# Patient Record
Sex: FEMALE | Race: OTHER | HISPANIC OR LATINO | URBAN - METROPOLITAN AREA
[De-identification: names, ages, dates, MRNs, and addresses within clinical notes are randomized per-mention and may not be internally consistent; named-entity substitution may affect disease eponyms.]

---
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
## 2022-12-01 ENCOUNTER — EMERGENCY (EMERGENCY)
Facility: HOSPITAL | Age: 22
LOS: 1 days | Discharge: HOME | End: 2022-12-01
Attending: STUDENT IN AN ORGANIZED HEALTH CARE EDUCATION/TRAINING PROGRAM
Payer: SELF-PAY

## 2022-12-01 VITALS
HEART RATE: 113 BPM | SYSTOLIC BLOOD PRESSURE: 119 MMHG | TEMPERATURE: 103 F | OXYGEN SATURATION: 98 % | DIASTOLIC BLOOD PRESSURE: 60 MMHG | RESPIRATION RATE: 20 BRPM | WEIGHT: 128.09 LBS

## 2022-12-01 VITALS
OXYGEN SATURATION: 98 % | DIASTOLIC BLOOD PRESSURE: 67 MMHG | RESPIRATION RATE: 17 BRPM | HEART RATE: 97 BPM | SYSTOLIC BLOOD PRESSURE: 111 MMHG

## 2022-12-01 LAB
FLUAV AG NPH QL: DETECTED
FLUBV AG NPH QL: SIGNIFICANT CHANGE UP
RSV RNA NPH QL NAA+NON-PROBE: SIGNIFICANT CHANGE UP
SARS-COV-2 RNA SPEC QL NAA+PROBE: SIGNIFICANT CHANGE UP

## 2022-12-01 PROCEDURE — 99283 EMERGENCY DEPT VISIT LOW MDM: CPT

## 2022-12-01 PROCEDURE — 87637 SARSCOV2&INF A&B&RSV AMP PRB: CPT

## 2022-12-01 PROCEDURE — 99284 EMERGENCY DEPT VISIT MOD MDM: CPT

## 2022-12-01 RX ORDER — ONDANSETRON 8 MG/1
4 TABLET, FILM COATED ORAL ONCE
Refills: 0 | Status: COMPLETED | OUTPATIENT
Start: 2022-12-01 | End: 2022-12-01

## 2022-12-01 RX ORDER — ACETAMINOPHEN 500 MG
650 TABLET ORAL ONCE
Refills: 0 | Status: COMPLETED | OUTPATIENT
Start: 2022-12-01 | End: 2022-12-01

## 2022-12-01 RX ORDER — ONDANSETRON 8 MG/1
1 TABLET, FILM COATED ORAL
Qty: 12 | Refills: 0
Start: 2022-12-01 | End: 2022-12-04

## 2022-12-01 RX ADMIN — Medication 650 MILLIGRAM(S): at 21:19

## 2022-12-01 RX ADMIN — ONDANSETRON 4 MILLIGRAM(S): 8 TABLET, FILM COATED ORAL at 21:19

## 2022-12-01 NOTE — ED ADULT TRIAGE NOTE - CHIEF COMPLAINT QUOTE
C/O fever, headache, body aches, nausea and vomiting  for one day. Last took Tylenol around 1pm. States she works with a lot of people with similar symptoms.

## 2022-12-01 NOTE — ED PROVIDER NOTE - PATIENT PORTAL LINK FT
You can access the FollowMyHealth Patient Portal offered by [facility] by registering at the following website: [email or web address]. By joining [employer]’s FollowMyHealth portal, you will also be able to view your health information using other applications (apps) compatible with our system.

## 2022-12-01 NOTE — ED PROVIDER NOTE - CLINICAL SUMMARY MEDICAL DECISION MAKING FREE TEXT BOX
22-year-old female with flulike symptoms.  Will control fever Zofran for antiemetic.  Flu swab.  We will send over Tamiflu to the pharmacy

## 2022-12-01 NOTE — ED ADULT NURSE NOTE - OBJECTIVE STATEMENT
pt states she is nauseas, sore throat, generlized body aches, starting 3 days ago. pt states she has been taking motrin and vicks. respirations even and unlabored.

## 2022-12-01 NOTE — ED PROVIDER NOTE - ATTENDING APP SHARED VISIT CONTRIBUTION OF CARE
Pt with a 2 d hx of cough, sore throat, congestion, fever, vomiting. no diarrhea. no other complaints.    physical nontoxic. rrr. ctab. abd - soft, nt. no rebound. no guarding. no edema. no rash.    plan - Pt with viral syndrome. pt tolerating po in Ed. Pt reassured and given f/up and return precautions.

## 2022-12-01 NOTE — ED PROVIDER NOTE - OBJECTIVE STATEMENT
22-year-old female with no significant medical history presents to the ED complaining of cough congestion sore throat and vomiting for the past 2 days.  Patient admits to multiple sick contacts at work with similar symptoms.  Patient taking Tylenol with relief of her fever but fever stone returned after Tylenol wears off.  Tolerating secretions.  No difficulty swallowing.  Last took Tylenol around 1 PM today.  Patient not vaccinated for flu
